# Patient Record
Sex: FEMALE | Race: WHITE | Employment: UNEMPLOYED | URBAN - METROPOLITAN AREA
[De-identification: names, ages, dates, MRNs, and addresses within clinical notes are randomized per-mention and may not be internally consistent; named-entity substitution may affect disease eponyms.]

---
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
## 2018-07-30 ENCOUNTER — TELEPHONE (OUTPATIENT)
Dept: DERMATOLOGY | Facility: CLINIC | Age: 18
End: 2018-07-30

## 2018-07-30 NOTE — TELEPHONE ENCOUNTER
Reason for call:  Medication   If this is a refill request, has the caller requested the refill from the pharmacy already? Yes  Will the patient be using a [facility]? No  Name of the pharmacy and phone number for the current request: [facility] in [facility] [phone]    Name of the medication requested: Drospirenone-ee 3-0.03 mg     Other request: Does she need to come in as the soonest appointment wouldn't be until Sept.    Phone number to reach patient:  Home number on file [phone] (home)    Best Time:  Any time    Can we leave a detailed message on this number?  YES

## 2018-07-30 NOTE — TELEPHONE ENCOUNTER
Per 12- Dermatology office visit dictation:      Acne Vulgaris   Refilled Nelda, can stop when she want since she is not using it for contraception. If acne flares can go back on the medication.   Message left for patient that she has refills available for this medication and she just needs to let the pharmacy know if she wants to go back on it. I did call the pharmacy and did verify she has refills available. Freya Rivera RN

## 2019-01-18 DIAGNOSIS — L70.0 ACNE VULGARIS: ICD-10-CM

## 2019-01-18 NOTE — LETTER
[facility]  [address]  [facility] [address]  Phone: [phone]    2019    Shannan C Jordy                                                                                                              [address] [facility] [address]            Dear MsAlfredo Spence,    We are concerned about your health care.  We recently provided you with a medication refill.  Many medications require routine follow-up with your Dermatology Provider.      At this time we ask that: You schedule a routine office visit with your Dermatology Provider to follow your Acne. It has been over 1 year since you were last seen in Dermatology clinic on 2017.    Your prescription: Is  and has been refilled for 1 month so you may have time for the above noted follow-up. Please be seen prior to needing your next refill of medication.     We are currently booking appointments 4-6 weeks in advance.     Thank you,      Kerrie VALLES / mmc

## 2019-01-18 NOTE — TELEPHONE ENCOUNTER
Reason for Call:  Medication or medication refill:    Do you use a [facility]?  Name of the pharmacy and phone number for the current request:  [facility] [place] [phone]    Name of the medication requested: Drospirenone-ethinyl estradiol    Other request: Pt states she needs by tomorrow    Can we leave a detailed message on this number? YES    Phone number patient can be reached at: Cell number on file:    Telephone Information:   Mobile [phone]       Best Time: Any    Call taken on 1/18/2019 at 12:51 PM by Denise Behrendt

## 2019-01-19 RX ORDER — DROSPIRENONE AND ETHINYL ESTRADIOL 0.03MG-3MG
1 KIT ORAL DAILY
Qty: 28 TABLET | Refills: 2 | Status: SHIPPED | OUTPATIENT
Start: 2019-01-19 | End: 2019-05-10

## 2019-01-19 NOTE — TELEPHONE ENCOUNTER
Pt calling [facility] to check on the status of her refill request.      Pt has not been seen in clinic since 12/21/17, unable to fill per protocol.      Pt requesting to have the doctor on-call paged.     1:09PM:  [place] answering service contacted, writer was connected with Dermatologist pcp Kerrie Malone who gave approval to send a 3 month supply of Nelda to the pharmacy and to have pt make a f/u appointment.    1:12PM: Writer left pt a voicemail on her cell phone with the instructions above and to call back if she needs any further assistance.     Lita Martinez RN/MAGGIE

## 2019-01-21 NOTE — TELEPHONE ENCOUNTER
> 1 year since seen. Needs appointment. Letter sent to notify of need to be seen. Freya Rivera RN

## 2019-02-18 ENCOUNTER — OFFICE VISIT (OUTPATIENT)
Dept: DERMATOLOGY | Facility: CLINIC | Age: 19
End: 2019-02-18
Payer: COMMERCIAL

## 2019-02-18 VITALS
HEIGHT: 66 IN | HEART RATE: 58 BPM | SYSTOLIC BLOOD PRESSURE: 108 MMHG | BODY MASS INDEX: 23.47 KG/M2 | DIASTOLIC BLOOD PRESSURE: 68 MMHG

## 2019-02-18 DIAGNOSIS — L70.0 ACNE VULGARIS: Primary | ICD-10-CM

## 2019-02-18 PROCEDURE — 99212 OFFICE O/P EST SF 10 MIN: CPT | Performed by: PHYSICIAN ASSISTANT

## 2019-02-18 RX ORDER — DROSPIRENONE AND ETHINYL ESTRADIOL 0.03MG-3MG
1 KIT ORAL DAILY
Qty: 84 TABLET | Refills: 3 | Status: SHIPPED | OUTPATIENT
Start: 2019-02-18 | End: 2020-03-05

## 2019-02-18 RX ORDER — TRETINOIN 1 MG/G
CREAM TOPICAL AT BEDTIME
Qty: 20 G | Refills: 11 | Status: SHIPPED | OUTPATIENT
Start: 2019-02-18 | End: 2020-08-20

## 2019-02-18 NOTE — PROGRESS NOTES
"HPI:   Shannan Spence is a 18 year old female who presents for recheck of acne.  Patient states she is doing exellent today.  She discontinued her oral contraceptive and had a flare although when she continued she cleared.  chief complaint  Condition has been present for: years  Pt complains of pain: No     Previous treatments include: tretinoin  Areas Involved: face    Going to [employer] for strategic communications wants to be an .  Current Outpatient Medications   Medication Sig Dispense Refill     drospirenone-ethinyl estradiol (GABRIELLE) 3-0.03 MG tablet Take 1 tablet by mouth daily 28 tablet 2     tretinoin (RETIN-A) 0.1 % cream Apply topically At Bedtime Apply pea sized amouth to face at bedtime 20 g 11     Allergies   Allergen Reactions     No Known Drug Allergies      Denies any other skin complaints, in general feels well: Yes  Review of symptoms otherwise negative:Yes    This document serves as a record of the services and decisions personally performed and made by Suzette Pringle PA-C. It was created on her behalf by Tonja Calles, a trained medical scribe. The creation of this document is based the provider's statements to the medical scribe.  Tonja Calles February 18, 2019 11:32 AM   PHYSICAL EXAM:   A&Ox3: Yes   Well developed/well nourished female Yes   Mood appropriate Yes      /68   Pulse 58   Ht 1.676 m (5' 6\")   BMI 23.47 kg/m    Type 2 skin. Mood appropriate  Alert and Oriented X 3. Well developed, well nourished in no distress.  General appearance: Normal  Head including face: Normal  Eyes: conjunctiva and lids: Normal  Mouth: Lips, teeth, gums: Normal  Neck: Normal  Back: clear  Cardiovascular: Exam of peripheral vascular system by observation for swelling, varicosities, edema: Normal  Extremities: digits/nails (clubbing): Normal  Right upper extremity: Normal  Left upper extremity: Normal  Right lower extremity: Normal  Left lower extremity: Normal  Skin: Scalp and body " hair: See below     Comedones Papules/Pustules Cysts Staining Scarring   Face/Neck 0-1+ 0 0 0 0   Chest 0 0 0 0 0   Back 0 0 0 0 0     Telangiectasias: No Fixed Erythema: No Exoriations: No   Other Physical Exam Findings:    ASSESSMENT & PLAN:     1. Acne Vulgaris - Doing fantastic on current regimen. advised on diagnosis and treatment options. Discussed use of topical medications and antibiotics.    --Continue Nelda Advised to take at same time every single day. Discussed increased risk of DVT; denies any personal or fhx of coagulopathy; does not smoke. Advised if experiencing any unexplained pain or swelling in legs, CP or SOB to contact clinic immediately.    --Continue tretinoin 0.1% cream at bedtime.  --Will call if she has a flare and needs antibiotic.        Pt advised on use and risks including photosensitivity, allergic reactions, GI upset, headaches, nausea, erythema, scaling, vertigo, asthralgias, blood clots:Yes    Follow-up: yearly/PRN sooner  CC:   Scribed By: Tonja Calles Medical Scribe    The information in this document, created by the medical scribe for me, accurately reflects the services I personally performed and the decisions made by me. I have reviewed and approved this document for accuracy prior to leaving the patient care area.  Suzette Pringle PA-C February 18, 2019 11:37 AM

## 2019-02-18 NOTE — NURSING NOTE
"Initial /68   Pulse 58   Ht 1.676 m (5' 6\")   BMI 23.47 kg/m   Estimated body mass index is 23.47 kg/m  as calculated from the following:    Height as of this encounter: 1.676 m (5' 6\").    Weight as of 5/17/16: 66 kg (145 lb 6.4 oz). .      "

## 2019-02-18 NOTE — LETTER
"    2/18/2019         RE: Shannan Spence  [address] [facility] [address]        Dear Colleague,    Thank you for referring your patient, Shannan Spence, to the [facility]. Please see a copy of my visit note below.    HPI:   Shannan Spence is a 18 year old female who presents for recheck of acne.  Patient states she is doing exellent today.  She discontinued her oral contraceptive and had a flare although when she continued she cleared.  chief complaint  Condition has been present for: years  Pt complains of pain: No     Previous treatments include: tretinoin  Areas Involved: face    Going to [employer] for strategic communications wants to be an .  Current Outpatient Medications   Medication Sig Dispense Refill     drospirenone-ethinyl estradiol (GABRIELLE) 3-0.03 MG tablet Take 1 tablet by mouth daily 28 tablet 2     tretinoin (RETIN-A) 0.1 % cream Apply topically At Bedtime Apply pea sized amouth to face at bedtime 20 g 11     Allergies   Allergen Reactions     No Known Drug Allergies      Denies any other skin complaints, in general feels well: Yes  Review of symptoms otherwise negative:Yes    This document serves as a record of the services and decisions personally performed and made by Suzette Pringle PA-C. It was created on her behalf by Tonja Calles, a trained medical scribe. The creation of this document is based the provider's statements to the medical scribe.  Tonja Calles February 18, 2019 11:32 AM   PHYSICAL EXAM:   A&Ox3: Yes   Well developed/well nourished female Yes   Mood appropriate Yes      /68   Pulse 58   Ht 1.676 m (5' 6\")   BMI 23.47 kg/m     Type 2 skin. Mood appropriate  Alert and Oriented X 3. Well developed, well nourished in no distress.  General appearance: Normal  Head including face: Normal  Eyes: conjunctiva and lids: Normal  Mouth: Lips, teeth, gums: Normal  Neck: Normal  Back: clear  Cardiovascular: Exam of peripheral vascular " system by observation for swelling, varicosities, edema: Normal  Extremities: digits/nails (clubbing): Normal  Right upper extremity: Normal  Left upper extremity: Normal  Right lower extremity: Normal  Left lower extremity: Normal  Skin: Scalp and body hair: See below     Comedones Papules/Pustules Cysts Staining Scarring   Face/Neck 0-1+ 0 0 0 0   Chest 0 0 0 0 0   Back 0 0 0 0 0     Telangiectasias: No Fixed Erythema: No Exoriations: No   Other Physical Exam Findings:    ASSESSMENT & PLAN:     1. Acne Vulgaris - Doing fantastic on current regimen. advised on diagnosis and treatment options. Discussed use of topical medications and antibiotics.    --Continue Nelda Advised to take at same time every single day. Discussed increased risk of DVT; denies any personal or fhx of coagulopathy; does not smoke. Advised if experiencing any unexplained pain or swelling in legs, CP or SOB to contact clinic immediately.    --Continue tretinoin 0.1% cream at bedtime.  --Will call if she has a flare and needs antibiotic.        Pt advised on use and risks including photosensitivity, allergic reactions, GI upset, headaches, nausea, erythema, scaling, vertigo, asthralgias, blood clots:Yes    Follow-up: yearly/PRN sooner  CC:   Scribed By: Tonja Calles, Medical Scribe    The information in this document, created by the medical scribe for me, accurately reflects the services I personally performed and the decisions made by me. I have reviewed and approved this document for accuracy prior to leaving the patient care area.  Suzette Pringle PA-C February 18, 2019 11:37 AM           Again, thank you for allowing me to participate in the care of your patient.        Sincerely,        Suzette Pringle PA-C

## 2019-05-10 ENCOUNTER — OFFICE VISIT (OUTPATIENT)
Dept: FAMILY MEDICINE | Facility: CLINIC | Age: 19
End: 2019-05-10
Payer: COMMERCIAL

## 2019-05-10 VITALS
SYSTOLIC BLOOD PRESSURE: 110 MMHG | HEART RATE: 56 BPM | TEMPERATURE: 98.1 F | RESPIRATION RATE: 16 BRPM | BODY MASS INDEX: 23.13 KG/M2 | HEIGHT: 65 IN | WEIGHT: 138.8 LBS | DIASTOLIC BLOOD PRESSURE: 76 MMHG

## 2019-05-10 DIAGNOSIS — Z00.00 ROUTINE GENERAL MEDICAL EXAMINATION AT A HEALTH CARE FACILITY: Primary | ICD-10-CM

## 2019-05-10 DIAGNOSIS — L70.0 ACNE VULGARIS: ICD-10-CM

## 2019-05-10 PROCEDURE — 99395 PREV VISIT EST AGE 18-39: CPT | Performed by: PHYSICIAN ASSISTANT

## 2019-05-10 ASSESSMENT — MIFFLIN-ST. JEOR: SCORE: 1400.47

## 2019-05-10 NOTE — PROGRESS NOTES
SUBJECTIVE:   CC: Shannan Spence is an 18 year old woman who presents for preventive health visit.     Healthy Habits:    Getting at least 3 servings of Calcium per day:  Yes    Bi-annual eye exam:  Yes    Dental care twice a year:  Yes    Sleep apnea or symptoms of sleep apnea:  None    Diet:  Regular (no restrictions)    Frequency of exercise:  4-5 days/week    Duration of exercise:  Greater than 60 minutes    Taking medications regularly:  Yes    Barriers to taking medications:  None    Medication side effects:  None    PHQ-2 Total Score:    Additional concerns today:  No    Exercise:  Running, antoine, abs.    Graduating next year ([employer])    Stress levels    Today's PHQ-2 Score:   PHQ-2 ( 1999 Pfizer) 5/17/2016   Q1: Little interest or pleasure in doing things 0   Q2: Feeling down, depressed or hopeless 0   PHQ-2 Score 0       Abuse: Current or Past(Physical, Sexual or Emotional)- No  Do you feel safe in your environment? Yes    Social History     Tobacco Use     Smoking status: Never Smoker     Smokeless tobacco: Never Used   Substance Use Topics     Alcohol use: No         Alcohol Use 5/17/2016   Prescreen: >3 drinks/day or >7 drinks/week? The patient does not drink >3 drinks per day nor >7 drinks per week.       Taking OCPs for acne and tolerating well. Prescribed by dermatologist.       Reviewed orders with patient.  Reviewed health maintenance and updated orders accordingly - Yes      Mammogram not appropriate for this patient based on age.    Pertinent mammograms are reviewed under the imaging tab.  History of abnormal Pap smear: NO - under age 21, PAP not appropriate for age     Reviewed and updated as needed this visit by clinical staff  Tobacco  Allergies  Meds  Problems  Med Hx  Surg Hx  Fam Hx  Soc Hx          Reviewed and updated as needed this visit by Provider  Problems            Review of Systems  CONSTITUTIONAL: NEGATIVE for fever, chills, change in  "weight  INTEGUMENTARU/SKIN: NEGATIVE for worrisome rashes, moles or lesions  EYES: NEGATIVE for vision changes or irritation  ENT: NEGATIVE for ear, mouth and throat problems  RESP: NEGATIVE for significant cough or SOB  BREAST: NEGATIVE for masses, tenderness or discharge  CV: NEGATIVE for chest pain, palpitations or peripheral edema  GI: NEGATIVE for nausea, abdominal pain, heartburn, or change in bowel habits  : NEGATIVE for unusual urinary or vaginal symptoms. Periods are regular.  MUSCULOSKELETAL: NEGATIVE for significant arthralgias or myalgia  NEURO: NEGATIVE for weakness, dizziness or paresthesias  PSYCHIATRIC: NEGATIVE for changes in mood or affect     OBJECTIVE:   /76   Pulse 56   Temp 98.1  F (36.7  C) (Tympanic)   Resp 16   Ht 1.651 m (5' 5\")   Wt 63 kg (138 lb 12.8 oz)   LMP 05/05/2019 (Exact Date)   BMI 23.10 kg/m    Physical Exam  GENERAL: healthy, alert and no distress  EYES: Eyes grossly normal to inspection, PERRL and conjunctivae and sclerae normal  HENT: ear canals and TM's normal, nose and mouth without ulcers or lesions  NECK: no adenopathy, no asymmetry, masses, or scars and thyroid normal to palpation  RESP: lungs clear to auscultation - no rales, rhonchi or wheezes  CV: regular rate and rhythm, normal S1 S2, no S3 or S4, no murmur, click or rub, no peripheral edema and peripheral pulses strong  ABDOMEN: soft, nontender, no hepatosplenomegaly, no masses and bowel sounds normal  MS: no gross musculoskeletal defects noted, no edema  SKIN: no suspicious lesions or rashes  NEURO: Normal strength and tone, mentation intact and speech normal  PSYCH: mentation appears normal, affect normal/bright    Diagnostic Test Results:  none     ASSESSMENT/PLAN:   1. Routine general medical examination at a health care facility       HEALTH CARE MAINTENANCE              Reviewed USPTF recommendations and anticipatory guidance.              See orders.   Today she is 18 and it is uncertain if " "insurance will cover pap today.  Will plan to do a pap at physical exam next year instead.  She is asymptomatic         2. Acne vulgaris  Managed by derm.       COUNSELING:  Reviewed preventive health counseling, as reflected in patient instructions       Regular exercise       Healthy diet/nutrition       Contraception       Safe sex practices/STD prevention    BP Readings from Last 1 Encounters:   05/10/19 110/76     Estimated body mass index is 23.1 kg/m  as calculated from the following:    Height as of this encounter: 1.651 m (5' 5\").    Weight as of this encounter: 63 kg (138 lb 12.8 oz).           reports that she has never smoked. She has never used smokeless tobacco.      Counseling Resources:  ATP IV Guidelines  Pooled Cohorts Equation Calculator  Breast Cancer Risk Calculator  FRAX Risk Assessment  ICSI Preventive Guidelines  Dietary Guidelines for Americans, 2010  USDA's MyPlate  ASA Prophylaxis  Lung CA Screening    Carlie Cuellar PA-C  [facility]  "

## 2019-05-10 NOTE — NURSING NOTE
"Initial /76   Pulse 56   Temp 98.1  F (36.7  C) (Tympanic)   Resp 16   Ht 1.651 m (5' 5\")   Wt 63 kg (138 lb 12.8 oz)   LMP 05/05/2019 (Exact Date)   BMI 23.10 kg/m   Estimated body mass index is 23.1 kg/m  as calculated from the following:    Height as of this encounter: 1.651 m (5' 5\").    Weight as of this encounter: 63 kg (138 lb 12.8 oz). .    Jenna Bonds, GINA on 5/10/2019 at 9:30 AM    "

## 2019-05-12 PROBLEM — L70.0 ACNE VULGARIS: Status: ACTIVE | Noted: 2019-05-12

## 2020-03-05 DIAGNOSIS — L70.0 ACNE VULGARIS: ICD-10-CM

## 2020-03-05 RX ORDER — DROSPIRENONE AND ETHINYL ESTRADIOL 0.03MG-3MG
1 KIT ORAL DAILY
Qty: 84 TABLET | Refills: 0 | Status: SHIPPED | OUTPATIENT
Start: 2020-03-05 | End: 2020-08-20

## 2020-03-05 NOTE — TELEPHONE ENCOUNTER
Requested Prescriptions   Pending Prescriptions Disp Refills     drospirenone-ethinyl estradiol (GABRIELLE 28) 3-0.03 MG tablet 84 tablet 3     Sig: Take 1 tablet by mouth daily       There is no refill protocol information for this order        Last office visit: 2/18/2019 with prescribing provider:  LESLIE Pringle   Future Office Visit:        Denise Behrendt  Specialty CSS

## 2020-03-05 NOTE — LETTER
[facility]  [address]  [facility] [place] [address]  Phone: [phone]    2020    Shannan C Jordy                                                                                                           [address] [facility] [address]            Dear Ms. Spence,    We are concerned about your health care.  We recently provided you with a medication refill.  Many medications require routine follow-up with your Dermatology Provider.      At this time we ask that: You schedule a routine office visit with your Dermatology Provider to follow your Acne. You need to be seen at least annually to renew a prescription.     Your prescription: Is over 1 year old and . You have been given a jenae refill so you may have time for the above noted follow-up. Please be seen prior to needing your next refill of medication.     We are currently booking appointments 4-6 weeks in advance, so please call as soon as possible to schedule follow up appointment.     Thank you,      Suztete VALLES / stacia

## 2020-08-18 DIAGNOSIS — L70.0 ACNE VULGARIS: ICD-10-CM

## 2020-08-18 RX ORDER — DROSPIRENONE AND ETHINYL ESTRADIOL 0.03MG-3MG
1 KIT ORAL DAILY
Qty: 84 TABLET | Refills: 0 | Status: CANCELLED | OUTPATIENT
Start: 2020-08-18

## 2020-08-18 NOTE — TELEPHONE ENCOUNTER
Requested Prescriptions   Pending Prescriptions Disp Refills     drospirenone-ethinyl estradiol (GABRIELLE 28) 3-0.03 MG tablet 84 tablet 0     Sig: Take 1 tablet by mouth daily       There is no refill protocol information for this order        Last office visit: 2/18/2019 with prescribing provider:  LESLIE Pringle   Future Office Visit:          Denise Behrendt  Specialty CSS

## 2020-08-18 NOTE — TELEPHONE ENCOUNTER
3/5/20 9:00 AM   Note      > 1 year. Needs appointment. Letter sent and note sent to pharmacy as well. Freya Rivera RN              Spoke to patient and advised she needs to be seen for follow up and letter was sent to notify of need in March 2020.     Transferred to appt scheduling.     Freya Rivera RN

## 2020-08-20 ENCOUNTER — VIRTUAL VISIT (OUTPATIENT)
Dept: DERMATOLOGY | Facility: CLINIC | Age: 20
End: 2020-08-20
Payer: COMMERCIAL

## 2020-08-20 DIAGNOSIS — L70.0 ACNE VULGARIS: ICD-10-CM

## 2020-08-20 PROCEDURE — 99213 OFFICE O/P EST LOW 20 MIN: CPT | Mod: GT | Performed by: PHYSICIAN ASSISTANT

## 2020-08-20 RX ORDER — DROSPIRENONE AND ETHINYL ESTRADIOL 0.03MG-3MG
1 KIT ORAL DAILY
Qty: 84 TABLET | Refills: 3 | Status: SHIPPED | OUTPATIENT
Start: 2020-08-20

## 2020-08-20 RX ORDER — TRETINOIN 1 MG/G
CREAM TOPICAL AT BEDTIME
Qty: 20 G | Refills: 11 | Status: SHIPPED | OUTPATIENT
Start: 2020-08-20

## 2020-08-20 NOTE — LETTER
"    8/20/2020         RE: Shannan Spence  [address]  [facility] [address]        Dear Colleague,    Thank you for referring your patient, Shannan Spence, to the [facility]. Please see a copy of my visit note below.        Shannan Spence is a 20 year old female who is being evaluated via a billable video visit.      The patient has been notified of following:     \"This video visit will be conducted via a call between you and your physician/provider. We have found that certain health care needs can be provided without the need for an in-person physical exam.  This service lets us provide the care you need with a video conversation.  If a prescription is necessary we can send it directly to your pharmacy.  If lab work is needed we can place an order for that and you can then stop by our lab to have the test done at a later time.    Video visits are billed at different rates depending on your insurance coverage.  Please reach out to your insurance provider with any questions.    If during the course of the call the physician/provider feels a video visit is not appropriate, you will not be charged for this service.\"    Patient has given verbal consent for Video visit? Yes    How would you like to obtain your AVS? NA  Video Start Time: 2:45 PM  Video End time: 2:55 PM        Shannan Spence is a 20 year old year old female patient here today for recheck acne vulgaris. She notes her skin is clear. She has been on genesis and tretinoin for past 4 years. She has had some anxiety in the past year, wonders if her birth control is contributing.   Patient has no other skin complaints today.  Remainder of the HPI, Meds, PMH, Allergies, FH, and SH was reviewed in chart.    Pertinent Hx:   Acne Vulgaris   Past Medical History:   Diagnosis Date     Acne vulgaris      Eczema        Past Surgical History:   Procedure Laterality Date     CHRISTIANO FORD          Family History   Problem Relation Age " of Onset     Hypertension Father      Diabetes Paternal Grandfather        Social History     Socioeconomic History     Marital status: Single     Spouse name: Not on file     Number of children: Not on file     Years of education: Not on file     Highest education level: Not on file   Occupational History     Employer: [employer]   Social Needs     Financial resource strain: Not on file     Food insecurity     Worry: Not on file     Inability: Not on file     Transportation needs     Medical: Not on file     Non-medical: Not on file   Tobacco Use     Smoking status: Never Smoker     Smokeless tobacco: Never Used   Substance and Sexual Activity     Alcohol use: No     Drug use: No     Sexual activity: Not Currently     Partners: Male   Lifestyle     Physical activity     Days per week: Not on file     Minutes per session: Not on file     Stress: Not on file   Relationships     Social connections     Talks on phone: Not on file     Gets together: Not on file     Attends Nondenominational service: Not on file     Active member of club or organization: Not on file     Attends meetings of clubs or organizations: Not on file     Relationship status: Not on file     Intimate partner violence     Fear of current or ex partner: Not on file     Emotionally abused: Not on file     Physically abused: Not on file     Forced sexual activity: Not on file   Other Topics Concern     Parent/sibling w/ CABG, MI or angioplasty before 65F 55M? No   Social History Narrative     Not on file       Outpatient Encounter Medications as of 8/20/2020   Medication Sig Dispense Refill     drospirenone-ethinyl estradiol (GABRIELLE 28) 3-0.03 MG tablet Take 1 tablet by mouth daily 84 tablet 3     tretinoin (RETIN-A) 0.1 % external cream Apply topically At Bedtime Apply pea sized amouth to face at bedtime 20 g 11     [DISCONTINUED] drospirenone-ethinyl estradiol (GABRIELLE 28) 3-0.03 MG tablet Take 1 tablet by mouth daily 84 tablet 0     [DISCONTINUED] tretinoin  (RETIN-A) 0.1 % external cream Apply topically At Bedtime Apply pea sized amouth to face at bedtime 20 g 11     No facility-administered encounter medications on file as of 8/20/2020.              Review Of Systems  Skin: As above  Eyes: negative  Ears/Nose/Throat: negative  Respiratory: No shortness of breath, dyspnea on exertion, cough, or hemoptysis  Cardiovascular: negative  Gastrointestinal: negative  Genitourinary: negative  Musculoskeletal: negative  Neurologic: negative  Psychiatric: negative  Hematologic/Lymphatic/Immunologic: negative  Endocrine: negative      O:   Alert & Orientedx3, Mood & Affect wnl,    General appearance normal   Alert, oriented and in no acute distress     Video: skin is clear       Pulm: Breathing Normal, talking in normal sentences, no shortness of breath during conversation    Neuro/Psych: Orientation:Alert and Orientedx3 ; Mood/Affect:normal ; no anxiety or depression       A/P:  1. Acne Vulgaris   Skin is clear.   She is unsure if birth control is contributing to her anxiety.   She does not want to stop until she discusses with her pcp.   She wants refills on birth control   Discussed if she chooses to change to a copper IUD and if her acne is flaring. Can start spironolactone   Continue tretinoin at bedtime.       Again, thank you for allowing me to participate in the care of your patient.        Sincerely,        Kerrie Mar PA-C

## 2020-08-21 NOTE — PROGRESS NOTES
"    Shannan Spence is a 20 year old female who is being evaluated via a billable video visit.      The patient has been notified of following:     \"This video visit will be conducted via a call between you and your physician/provider. We have found that certain health care needs can be provided without the need for an in-person physical exam.  This service lets us provide the care you need with a video conversation.  If a prescription is necessary we can send it directly to your pharmacy.  If lab work is needed we can place an order for that and you can then stop by our lab to have the test done at a later time.    Video visits are billed at different rates depending on your insurance coverage.  Please reach out to your insurance provider with any questions.    If during the course of the call the physician/provider feels a video visit is not appropriate, you will not be charged for this service.\"    Patient has given verbal consent for Video visit? Yes    How would you like to obtain your AVS? NA  Video Start Time: 2:45 PM  Video End time: 2:55 PM        Shannan Spence is a 20 year old year old female patient here today for recheck acne vulgaris. She notes her skin is clear. She has been on genesis and tretinoin for past 4 years. She has had some anxiety in the past year, wonders if her birth control is contributing.   Patient has no other skin complaints today.  Remainder of the HPI, Meds, PMH, Allergies, FH, and SH was reviewed in chart.    Pertinent Hx:   Acne Vulgaris   Past Medical History:   Diagnosis Date     Acne vulgaris      Eczema        Past Surgical History:   [facility]          Family History   Problem Relation Age of Onset     Hypertension Father      Diabetes Paternal Grandfather        Social History     Socioeconomic History     Marital status: Single     Spouse name: Not on file     Number of children: Not on file     Years of education: Not on file     " Highest education level: Not on file   Occupational History     Employer: [employer]   Social Needs     Financial resource strain: Not on file     Food insecurity     Worry: Not on file     Inability: Not on file     Transportation needs     Medical: Not on file     Non-medical: Not on file   Tobacco Use     Smoking status: Never Smoker     Smokeless tobacco: Never Used   Substance and Sexual Activity     Alcohol use: No     Drug use: No     Sexual activity: Not Currently     Partners: Male   Lifestyle     Physical activity     Days per week: Not on file     Minutes per session: Not on file     Stress: Not on file   Relationships     Social connections     Talks on phone: Not on file     Gets together: Not on file     Attends Alevism service: Not on file     Active member of club or organization: Not on file     Attends meetings of clubs or organizations: Not on file     Relationship status: Not on file     Intimate partner violence     Fear of current or ex partner: Not on file     Emotionally abused: Not on file     Physically abused: Not on file     Forced sexual activity: Not on file   Other Topics Concern     Parent/sibling w/ CABG, MI or angioplasty before 65F 55M? No   Social History Narrative     Not on file       Outpatient Encounter Medications as of 8/20/2020   Medication Sig Dispense Refill     drospirenone-ethinyl estradiol (GABRIELLE 28) 3-0.03 MG tablet Take 1 tablet by mouth daily 84 tablet 3     tretinoin (RETIN-A) 0.1 % external cream Apply topically At Bedtime Apply pea sized amouth to face at bedtime 20 g 11     [DISCONTINUED] drospirenone-ethinyl estradiol (GABRIELLE 28) 3-0.03 MG tablet Take 1 tablet by mouth daily 84 tablet 0     [DISCONTINUED] tretinoin (RETIN-A) 0.1 % external cream Apply topically At Bedtime Apply pea sized amouth to face at bedtime 20 g 11     No facility-administered encounter medications on file as of 8/20/2020.              Review Of Systems  Skin: As above  Eyes:  negative  Ears/Nose/Throat: negative  Respiratory: No shortness of breath, dyspnea on exertion, cough, or hemoptysis  Cardiovascular: negative  Gastrointestinal: negative  Genitourinary: negative  Musculoskeletal: negative  Neurologic: negative  Psychiatric: negative  Hematologic/Lymphatic/Immunologic: negative  Endocrine: negative      O:   Alert & Orientedx3, Mood & Affect wnl,    General appearance normal   Alert, oriented and in no acute distress     Video: skin is clear       Pulm: Breathing Normal, talking in normal sentences, no shortness of breath during conversation    Neuro/Psych: Orientation:Alert and Orientedx3 ; Mood/Affect:normal ; no anxiety or depression       A/P:  1. Acne Vulgaris   Skin is clear.   She is unsure if birth control is contributing to her anxiety.   She does not want to stop until she discusses with her pcp.   She wants refills on birth control   Discussed if she chooses to change to a copper IUD and if her acne is flaring. Can start spironolactone   Continue tretinoin at bedtime.

## 2021-06-23 ENCOUNTER — TELEPHONE (OUTPATIENT)
Dept: DERMATOLOGY | Facility: CLINIC | Age: 21
End: 2021-06-23

## 2021-06-23 NOTE — TELEPHONE ENCOUNTER
Spoke to pharmacy and advised rx was sent for 1 year on 8-.     Apparently patient has gotten  and name has changed, pharmacy had sent a fax today, but no patient by the  name was found in our computer, so pharmacy called. Freya Rivera RN

## 2021-06-23 NOTE — TELEPHONE ENCOUNTER
Reason for Call:  Other prescription    Detailed comments: Pharmacy calling requesting refill for Nelda.    Phone Number Patient can be reached at: [phone]    Best Time:     Can we leave a detailed message on this number? Not Applicable    Call taken on 6/23/2021 at 2:36 PM by Aimee Joshi